# Patient Record
Sex: FEMALE | Race: WHITE | Employment: FULL TIME | URBAN - NONMETROPOLITAN AREA
[De-identification: names, ages, dates, MRNs, and addresses within clinical notes are randomized per-mention and may not be internally consistent; named-entity substitution may affect disease eponyms.]

---

## 2017-02-02 ENCOUNTER — OFFICE VISIT (OUTPATIENT)
Dept: FAMILY MEDICINE CLINIC | Age: 52
End: 2017-02-02

## 2017-02-02 VITALS
BODY MASS INDEX: 17.74 KG/M2 | SYSTOLIC BLOOD PRESSURE: 118 MMHG | HEIGHT: 59 IN | WEIGHT: 88 LBS | OXYGEN SATURATION: 98 % | HEART RATE: 94 BPM | DIASTOLIC BLOOD PRESSURE: 62 MMHG

## 2017-02-02 DIAGNOSIS — Z00.00 ROUTINE HEALTH MAINTENANCE: Primary | ICD-10-CM

## 2017-02-02 DIAGNOSIS — B97.89 VIRAL SINUSITIS: ICD-10-CM

## 2017-02-02 DIAGNOSIS — M13.80 ALLERGIC ARTHRITIS: ICD-10-CM

## 2017-02-02 DIAGNOSIS — J32.9 VIRAL SINUSITIS: ICD-10-CM

## 2017-02-02 PROCEDURE — 99396 PREV VISIT EST AGE 40-64: CPT | Performed by: NURSE PRACTITIONER

## 2017-02-02 RX ORDER — CETIRIZINE HYDROCHLORIDE 10 MG/1
10 TABLET ORAL DAILY
Qty: 30 TABLET | Refills: 11 | Status: SHIPPED | OUTPATIENT
Start: 2017-02-02 | End: 2019-03-01

## 2017-02-02 RX ORDER — METHYLPREDNISOLONE 4 MG/1
TABLET ORAL
Qty: 1 KIT | Refills: 1 | Status: SHIPPED | OUTPATIENT
Start: 2017-02-02 | End: 2017-02-08

## 2017-02-02 ASSESSMENT — ENCOUNTER SYMPTOMS
CONSTIPATION: 0
NAUSEA: 0
COUGH: 1
SINUS PRESSURE: 0
DIARRHEA: 0
GASTROINTESTINAL NEGATIVE: 1
ABDOMINAL PAIN: 0
RHINORRHEA: 1
CHEST TIGHTNESS: 0
SHORTNESS OF BREATH: 1
TROUBLE SWALLOWING: 0
ALLERGIC/IMMUNOLOGIC NEGATIVE: 1
EYES NEGATIVE: 1
VOMITING: 0

## 2017-02-14 ENCOUNTER — TELEPHONE (OUTPATIENT)
Dept: FAMILY MEDICINE CLINIC | Age: 52
End: 2017-02-14

## 2017-03-08 ENCOUNTER — TELEPHONE (OUTPATIENT)
Dept: FAMILY MEDICINE CLINIC | Age: 52
End: 2017-03-08

## 2017-03-30 ENCOUNTER — TELEPHONE (OUTPATIENT)
Dept: FAMILY MEDICINE CLINIC | Age: 52
End: 2017-03-30

## 2017-04-24 ENCOUNTER — OFFICE VISIT (OUTPATIENT)
Dept: OBGYN | Age: 52
End: 2017-04-24
Payer: COMMERCIAL

## 2017-04-24 VITALS
HEART RATE: 60 BPM | DIASTOLIC BLOOD PRESSURE: 60 MMHG | WEIGHT: 86 LBS | RESPIRATION RATE: 16 BRPM | SYSTOLIC BLOOD PRESSURE: 98 MMHG | HEIGHT: 59 IN | BODY MASS INDEX: 17.34 KG/M2

## 2017-04-24 DIAGNOSIS — R10.2 PELVIC PAIN IN FEMALE: Primary | ICD-10-CM

## 2017-04-24 DIAGNOSIS — B02.9 HERPES ZOSTER WITHOUT COMPLICATION: ICD-10-CM

## 2017-04-24 DIAGNOSIS — Z85.3 HISTORY OF BREAST CANCER: ICD-10-CM

## 2017-04-24 PROCEDURE — G8419 CALC BMI OUT NRM PARAM NOF/U: HCPCS | Performed by: OBSTETRICS & GYNECOLOGY

## 2017-04-24 PROCEDURE — 99213 OFFICE O/P EST LOW 20 MIN: CPT | Performed by: OBSTETRICS & GYNECOLOGY

## 2017-04-24 PROCEDURE — 1036F TOBACCO NON-USER: CPT | Performed by: OBSTETRICS & GYNECOLOGY

## 2017-04-24 PROCEDURE — G8427 DOCREV CUR MEDS BY ELIG CLIN: HCPCS | Performed by: OBSTETRICS & GYNECOLOGY

## 2017-04-24 PROCEDURE — 3014F SCREEN MAMMO DOC REV: CPT | Performed by: OBSTETRICS & GYNECOLOGY

## 2017-04-24 RX ORDER — VALACYCLOVIR HYDROCHLORIDE 1 G/1
TABLET, FILM COATED ORAL
Qty: 21 TABLET | Refills: 0 | Status: SHIPPED | OUTPATIENT
Start: 2017-04-24 | End: 2017-05-19 | Stop reason: ALTCHOICE

## 2017-05-19 ENCOUNTER — OFFICE VISIT (OUTPATIENT)
Dept: FAMILY MEDICINE CLINIC | Age: 52
End: 2017-05-19
Payer: COMMERCIAL

## 2017-05-19 VITALS
RESPIRATION RATE: 12 BRPM | TEMPERATURE: 98.5 F | HEIGHT: 58 IN | WEIGHT: 85.8 LBS | BODY MASS INDEX: 18.01 KG/M2 | HEART RATE: 74 BPM | SYSTOLIC BLOOD PRESSURE: 106 MMHG | DIASTOLIC BLOOD PRESSURE: 70 MMHG

## 2017-05-19 DIAGNOSIS — J01.40 ACUTE NON-RECURRENT PANSINUSITIS: Primary | ICD-10-CM

## 2017-05-19 DIAGNOSIS — Z12.11 SCREENING FOR COLON CANCER: ICD-10-CM

## 2017-05-19 PROCEDURE — G8427 DOCREV CUR MEDS BY ELIG CLIN: HCPCS | Performed by: NURSE PRACTITIONER

## 2017-05-19 PROCEDURE — 3014F SCREEN MAMMO DOC REV: CPT | Performed by: NURSE PRACTITIONER

## 2017-05-19 PROCEDURE — G8419 CALC BMI OUT NRM PARAM NOF/U: HCPCS | Performed by: NURSE PRACTITIONER

## 2017-05-19 PROCEDURE — 1036F TOBACCO NON-USER: CPT | Performed by: NURSE PRACTITIONER

## 2017-05-19 PROCEDURE — 99213 OFFICE O/P EST LOW 20 MIN: CPT | Performed by: NURSE PRACTITIONER

## 2017-05-19 RX ORDER — PREDNISONE 20 MG/1
20 TABLET ORAL 2 TIMES DAILY
Qty: 10 TABLET | Refills: 0 | Status: SHIPPED | OUTPATIENT
Start: 2017-05-19 | End: 2017-05-24

## 2017-05-19 RX ORDER — AZITHROMYCIN 250 MG/1
TABLET, FILM COATED ORAL
Qty: 1 PACKET | Refills: 0 | Status: SHIPPED | OUTPATIENT
Start: 2017-05-19 | End: 2017-05-29

## 2017-05-19 ASSESSMENT — ENCOUNTER SYMPTOMS
COUGH: 1
EYES NEGATIVE: 1
DIARRHEA: 0
SINUS PRESSURE: 1
CHEST TIGHTNESS: 0
RHINORRHEA: 1
SHORTNESS OF BREATH: 0
NAUSEA: 0
CONSTIPATION: 0
TROUBLE SWALLOWING: 0
ABDOMINAL PAIN: 0
VOMITING: 0
ALLERGIC/IMMUNOLOGIC NEGATIVE: 1
GASTROINTESTINAL NEGATIVE: 1
SORE THROAT: 1

## 2017-05-19 ASSESSMENT — PATIENT HEALTH QUESTIONNAIRE - PHQ9
SUM OF ALL RESPONSES TO PHQ QUESTIONS 1-9: 0
SUM OF ALL RESPONSES TO PHQ9 QUESTIONS 1 & 2: 0
2. FEELING DOWN, DEPRESSED OR HOPELESS: 0
1. LITTLE INTEREST OR PLEASURE IN DOING THINGS: 0

## 2017-08-02 ENCOUNTER — NURSE ONLY (OUTPATIENT)
Dept: SURGERY | Age: 52
End: 2017-08-02

## 2017-08-02 ENCOUNTER — TELEPHONE (OUTPATIENT)
Dept: SURGERY | Age: 52
End: 2017-08-02

## 2017-08-02 VITALS
HEART RATE: 76 BPM | WEIGHT: 85.2 LBS | DIASTOLIC BLOOD PRESSURE: 60 MMHG | TEMPERATURE: 98.7 F | BODY MASS INDEX: 17.18 KG/M2 | SYSTOLIC BLOOD PRESSURE: 100 MMHG | HEIGHT: 59 IN

## 2017-08-02 DIAGNOSIS — Z12.11 COLON CANCER SCREENING: Primary | ICD-10-CM

## 2017-08-02 RX ORDER — MAGNESIUM 30 MG
30 TABLET ORAL DAILY
COMMUNITY

## 2017-09-23 ENCOUNTER — ANESTHESIA EVENT (OUTPATIENT)
Dept: OPERATING ROOM | Age: 52
End: 2017-09-23
Payer: COMMERCIAL

## 2017-09-25 ENCOUNTER — ANESTHESIA (OUTPATIENT)
Dept: OPERATING ROOM | Age: 52
End: 2017-09-25
Payer: COMMERCIAL

## 2017-09-25 ENCOUNTER — HOSPITAL ENCOUNTER (OUTPATIENT)
Age: 52
Setting detail: OUTPATIENT SURGERY
Discharge: HOME | End: 2017-09-25
Attending: SURGERY | Admitting: SURGERY
Payer: COMMERCIAL

## 2017-09-25 VITALS
SYSTOLIC BLOOD PRESSURE: 171 MMHG | DIASTOLIC BLOOD PRESSURE: 85 MMHG | RESPIRATION RATE: 19 BRPM | OXYGEN SATURATION: 99 %

## 2017-09-25 VITALS
SYSTOLIC BLOOD PRESSURE: 151 MMHG | HEIGHT: 59 IN | BODY MASS INDEX: 17.14 KG/M2 | DIASTOLIC BLOOD PRESSURE: 84 MMHG | HEART RATE: 75 BPM | RESPIRATION RATE: 18 BRPM | TEMPERATURE: 98.2 F | WEIGHT: 85 LBS | OXYGEN SATURATION: 99 %

## 2017-09-25 PROCEDURE — 7100000010 HC PHASE II RECOVERY - FIRST 15 MIN: Performed by: SURGERY

## 2017-09-25 PROCEDURE — 7100000011 HC PHASE II RECOVERY - ADDTL 15 MIN: Performed by: SURGERY

## 2017-09-25 PROCEDURE — 00810 PR ANESTH,INTESTINE,SCOPE,LOW: CPT | Performed by: NURSE ANESTHETIST, CERTIFIED REGISTERED

## 2017-09-25 PROCEDURE — 3700000000 HC ANESTHESIA ATTENDED CARE: Performed by: SURGERY

## 2017-09-25 PROCEDURE — 45378 DIAGNOSTIC COLONOSCOPY: CPT | Performed by: SURGERY

## 2017-09-25 PROCEDURE — 3700000001 HC ADD 15 MINUTES (ANESTHESIA): Performed by: SURGERY

## 2017-09-25 PROCEDURE — 3609027000 HC COLONOSCOPY: Performed by: SURGERY

## 2017-09-25 PROCEDURE — 6360000002 HC RX W HCPCS

## 2017-09-25 PROCEDURE — 2580000003 HC RX 258: Performed by: SURGERY

## 2017-09-25 RX ORDER — PROPOFOL 10 MG/ML
INJECTION, EMULSION INTRAVENOUS PRN
Status: DISCONTINUED | OUTPATIENT
Start: 2017-09-25 | End: 2017-09-25 | Stop reason: SDUPTHER

## 2017-09-25 RX ORDER — SODIUM CHLORIDE, SODIUM LACTATE, POTASSIUM CHLORIDE, CALCIUM CHLORIDE 600; 310; 30; 20 MG/100ML; MG/100ML; MG/100ML; MG/100ML
INJECTION, SOLUTION INTRAVENOUS CONTINUOUS
Status: DISCONTINUED | OUTPATIENT
Start: 2017-09-25 | End: 2017-09-25 | Stop reason: HOSPADM

## 2017-09-25 RX ADMIN — SODIUM CHLORIDE, POTASSIUM CHLORIDE, SODIUM LACTATE AND CALCIUM CHLORIDE: 600; 310; 30; 20 INJECTION, SOLUTION INTRAVENOUS at 10:40

## 2017-09-25 RX ADMIN — PROPOFOL 50 MG: 10 INJECTION, EMULSION INTRAVENOUS at 10:52

## 2017-09-25 RX ADMIN — PROPOFOL 50 MG: 10 INJECTION, EMULSION INTRAVENOUS at 10:55

## 2017-09-25 RX ADMIN — PROPOFOL 50 MG: 10 INJECTION, EMULSION INTRAVENOUS at 10:59

## 2017-09-25 ASSESSMENT — PAIN SCALES - GENERAL
PAINLEVEL_OUTOF10: 1
PAINLEVEL_OUTOF10: 0
PAINLEVEL_OUTOF10: 1

## 2017-09-25 ASSESSMENT — PAIN - FUNCTIONAL ASSESSMENT: PAIN_FUNCTIONAL_ASSESSMENT: 0-10

## 2017-10-09 ENCOUNTER — TELEPHONE (OUTPATIENT)
Dept: SURGERY | Age: 52
End: 2017-10-09

## 2017-11-13 ENCOUNTER — HOSPITAL ENCOUNTER (OUTPATIENT)
Dept: MAMMOGRAPHY | Age: 52
Discharge: HOME | End: 2017-11-13
Payer: COMMERCIAL

## 2017-11-13 ENCOUNTER — OFFICE VISIT (OUTPATIENT)
Dept: OBGYN | Age: 52
End: 2017-11-13
Payer: COMMERCIAL

## 2017-11-13 ENCOUNTER — HOSPITAL ENCOUNTER (OUTPATIENT)
Age: 52
Setting detail: SPECIMEN
Discharge: HOME | End: 2017-11-13
Payer: COMMERCIAL

## 2017-11-13 VITALS
RESPIRATION RATE: 14 BRPM | SYSTOLIC BLOOD PRESSURE: 124 MMHG | DIASTOLIC BLOOD PRESSURE: 76 MMHG | HEART RATE: 76 BPM | WEIGHT: 85.8 LBS | BODY MASS INDEX: 17.3 KG/M2 | HEIGHT: 59 IN

## 2017-11-13 DIAGNOSIS — R35.0 INCREASED URINARY FREQUENCY: ICD-10-CM

## 2017-11-13 DIAGNOSIS — Z90.710 HX OF HYSTERECTOMY: ICD-10-CM

## 2017-11-13 DIAGNOSIS — Z01.419 WOMEN'S ANNUAL ROUTINE GYNECOLOGICAL EXAMINATION: Primary | ICD-10-CM

## 2017-11-13 DIAGNOSIS — Z85.3 HX: BREAST CANCER: ICD-10-CM

## 2017-11-13 DIAGNOSIS — K59.04 CHRONIC IDIOPATHIC CONSTIPATION: ICD-10-CM

## 2017-11-13 DIAGNOSIS — Z12.39 BREAST CANCER SCREENING: ICD-10-CM

## 2017-11-13 DIAGNOSIS — R10.2 PELVIC PAIN IN FEMALE: ICD-10-CM

## 2017-11-13 LAB
-: NORMAL
AMORPHOUS: NORMAL
BACTERIA: NORMAL
BILIRUBIN URINE: NEGATIVE
CASTS UA: NORMAL /LPF (ref 0–2)
COLOR: NORMAL
COMMENT UA: NORMAL
CRYSTALS, UA: NORMAL /HPF
EPITHELIAL CELLS UA: NORMAL /HPF (ref 0–5)
GLUCOSE URINE: NEGATIVE
KETONES, URINE: NEGATIVE
LEUKOCYTE ESTERASE, URINE: NEGATIVE
MUCUS: NORMAL
NITRITE, URINE: NEGATIVE
OTHER OBSERVATIONS UA: NORMAL
PH UA: 5 (ref 5–6)
PROTEIN UA: NEGATIVE
RBC UA: NORMAL /HPF (ref 0–4)
RENAL EPITHELIAL, UA: NORMAL /HPF
SPECIFIC GRAVITY UA: 1.01 (ref 1.01–1.02)
TRICHOMONAS: NORMAL
TURBIDITY: NORMAL
URINE HGB: NEGATIVE
UROBILINOGEN, URINE: NORMAL
WBC UA: NORMAL /HPF (ref 0–4)
YEAST: NORMAL

## 2017-11-13 PROCEDURE — 99396 PREV VISIT EST AGE 40-64: CPT | Performed by: ADVANCED PRACTICE MIDWIFE

## 2017-11-13 PROCEDURE — 81001 URINALYSIS AUTO W/SCOPE: CPT

## 2017-11-13 PROCEDURE — 87086 URINE CULTURE/COLONY COUNT: CPT

## 2017-11-13 PROCEDURE — G0202 SCR MAMMO BI INCL CAD: HCPCS

## 2017-11-13 ASSESSMENT — ENCOUNTER SYMPTOMS
EYES NEGATIVE: 1
RESPIRATORY NEGATIVE: 1
GASTROINTESTINAL NEGATIVE: 1

## 2017-11-13 NOTE — PROGRESS NOTES
Subjective:      Patient ID: Phuc Marroquin  is a 47 y.o. y.o. female. Warren Abraham presents today for an annual exam.  She reports most recently low abdominal pain, sharp and shooting, intermittant for about 1-2 weeks. She has c/o urinary urgency and frequency. Review of Systems   Constitutional: Negative. HENT: Negative. Eyes: Negative. Respiratory: Negative. Cardiovascular: Negative. Gastrointestinal: Negative. Genitourinary: Positive for frequency and urgency. Musculoskeletal: Negative. Skin: Negative. Neurological: Negative. Endo/Heme/Allergies: Negative. Psychiatric/Behavioral: Negative. Objective:   Physical Exam   Constitutional: She is oriented to person, place, and time. She appears well-developed and well-nourished. HENT:   Head: Normocephalic and atraumatic. Eyes: Conjunctivae and EOM are normal. Pupils are equal, round, and reactive to light. Neck: Normal range of motion. Neck supple. Cardiovascular: Normal rate, regular rhythm and normal heart sounds. Pulmonary/Chest: Effort normal and breath sounds normal.   Abdominal: Soft. Bowel sounds are normal.   Genitourinary: Vagina normal.   Genitourinary Comments: Vaginal canal pink, rugae present, nl. vaginal secretions. Cervix and uterus surgically absent. Adnexa palpable, NT, note tenderness at suprapubic area. Musculoskeletal: Normal range of motion. Neurological: She is alert and oriented to person, place, and time. She has normal reflexes. Skin: Skin is warm and dry. Psychiatric: She has a normal mood and affect. Breast exam: Refused pt. Reports seeing oncologist 11/16/17 who will perform breast exam.    Sexually active: Yes    Any bleeding or pain with intercourse: No    Last Yearly:  One year ago    Last Pap 10/26/2016 Neg. ANNABEL       Last Mammogram: 11/13/2017      Do you do self breast exams: Yes    Assessment:     1. Women's annual routine gynecological examination     2.

## 2017-11-14 LAB
CULTURE: NO GROWTH
CULTURE: NORMAL
Lab: NORMAL
SPECIMEN DESCRIPTION: NORMAL
SPECIMEN DESCRIPTION: NORMAL
STATUS: NORMAL

## 2017-11-22 ENCOUNTER — HOSPITAL ENCOUNTER (OUTPATIENT)
Dept: ULTRASOUND IMAGING | Age: 52
Discharge: HOME | End: 2017-11-22
Payer: COMMERCIAL

## 2017-11-22 DIAGNOSIS — Z90.710 HX OF HYSTERECTOMY: ICD-10-CM

## 2017-11-22 DIAGNOSIS — Z85.3 HX: BREAST CANCER: ICD-10-CM

## 2017-11-22 DIAGNOSIS — R10.2 PELVIC PAIN IN FEMALE: ICD-10-CM

## 2017-11-22 PROCEDURE — 76830 TRANSVAGINAL US NON-OB: CPT

## 2017-11-22 PROCEDURE — 76856 US EXAM PELVIC COMPLETE: CPT

## 2018-06-26 ENCOUNTER — OFFICE VISIT (OUTPATIENT)
Dept: FAMILY MEDICINE CLINIC | Age: 53
End: 2018-06-26
Payer: COMMERCIAL

## 2018-06-26 VITALS
BODY MASS INDEX: 17.09 KG/M2 | OXYGEN SATURATION: 98 % | WEIGHT: 84.8 LBS | HEIGHT: 59 IN | SYSTOLIC BLOOD PRESSURE: 102 MMHG | DIASTOLIC BLOOD PRESSURE: 80 MMHG | HEART RATE: 78 BPM

## 2018-06-26 DIAGNOSIS — L03.113 CELLULITIS OF RIGHT ARM: Primary | ICD-10-CM

## 2018-06-26 PROCEDURE — 99213 OFFICE O/P EST LOW 20 MIN: CPT | Performed by: NURSE PRACTITIONER

## 2018-06-26 RX ORDER — DOXYCYCLINE 100 MG/1
100 TABLET ORAL 2 TIMES DAILY
Qty: 14 TABLET | Refills: 0 | Status: SHIPPED | OUTPATIENT
Start: 2018-06-26 | End: 2018-07-03

## 2018-06-26 ASSESSMENT — ENCOUNTER SYMPTOMS
ALLERGIC/IMMUNOLOGIC NEGATIVE: 1
CONSTIPATION: 0
TROUBLE SWALLOWING: 0
SINUS PRESSURE: 0
NAUSEA: 0
COUGH: 0
ABDOMINAL PAIN: 0
SHORTNESS OF BREATH: 0
VOMITING: 0
CHEST TIGHTNESS: 0
DIARRHEA: 0
EYES NEGATIVE: 1

## 2018-11-16 ENCOUNTER — HOSPITAL ENCOUNTER (OUTPATIENT)
Dept: MAMMOGRAPHY | Age: 53
Discharge: HOME | End: 2018-11-18
Payer: COMMERCIAL

## 2018-11-16 ENCOUNTER — TELEPHONE (OUTPATIENT)
Dept: GENERAL RADIOLOGY | Age: 53
End: 2018-11-16

## 2018-11-16 DIAGNOSIS — Z17.0 MALIGNANT NEOPLASM OF LOWER-OUTER QUADRANT OF LEFT BREAST OF FEMALE, ESTROGEN RECEPTOR POSITIVE (HCC): ICD-10-CM

## 2018-11-16 DIAGNOSIS — C50.512 MALIGNANT NEOPLASM OF LOWER-OUTER QUADRANT OF LEFT BREAST OF FEMALE, ESTROGEN RECEPTOR POSITIVE (HCC): ICD-10-CM

## 2018-11-16 PROCEDURE — 77067 SCR MAMMO BI INCL CAD: CPT

## 2019-02-18 LAB
CHOLESTEROL, TOTAL: 194 MG/DL
CHOLESTEROL/HDL RATIO: 3.8
CREATININE: 0.8 MG/DL
HDLC SERPL-MCNC: 51 MG/DL (ref 35–70)
LDL CHOLESTEROL CALCULATED: 130 MG/DL (ref 0–160)
POTASSIUM (K+): 4.2
TRIGL SERPL-MCNC: 64 MG/DL
TSH SERPL DL<=0.05 MIU/L-ACNC: 4.17 UIU/ML
VLDLC SERPL CALC-MCNC: 13 MG/DL

## 2019-03-01 ENCOUNTER — OFFICE VISIT (OUTPATIENT)
Dept: FAMILY MEDICINE CLINIC | Age: 54
End: 2019-03-01
Payer: COMMERCIAL

## 2019-03-01 VITALS
TEMPERATURE: 99.3 F | HEART RATE: 92 BPM | HEIGHT: 58 IN | BODY MASS INDEX: 19.1 KG/M2 | SYSTOLIC BLOOD PRESSURE: 104 MMHG | DIASTOLIC BLOOD PRESSURE: 62 MMHG | OXYGEN SATURATION: 98 % | WEIGHT: 91 LBS | RESPIRATION RATE: 12 BRPM

## 2019-03-01 DIAGNOSIS — Z01.419 ENCOUNTER FOR WELL WOMAN EXAM: ICD-10-CM

## 2019-03-01 DIAGNOSIS — J32.9 VIRAL SINUSITIS: Primary | ICD-10-CM

## 2019-03-01 DIAGNOSIS — B97.89 VIRAL SINUSITIS: Primary | ICD-10-CM

## 2019-03-01 PROCEDURE — 99213 OFFICE O/P EST LOW 20 MIN: CPT | Performed by: NURSE PRACTITIONER

## 2019-03-01 ASSESSMENT — ENCOUNTER SYMPTOMS
GASTROINTESTINAL NEGATIVE: 1
RESPIRATORY NEGATIVE: 1

## 2019-03-01 ASSESSMENT — PATIENT HEALTH QUESTIONNAIRE - PHQ9
SUM OF ALL RESPONSES TO PHQ9 QUESTIONS 1 & 2: 0
SUM OF ALL RESPONSES TO PHQ QUESTIONS 1-9: 0
SUM OF ALL RESPONSES TO PHQ QUESTIONS 1-9: 0
1. LITTLE INTEREST OR PLEASURE IN DOING THINGS: 0
2. FEELING DOWN, DEPRESSED OR HOPELESS: 0

## 2019-03-18 ENCOUNTER — HOSPITAL ENCOUNTER (OUTPATIENT)
Age: 54
Setting detail: SPECIMEN
Discharge: HOME | End: 2019-03-18
Payer: COMMERCIAL

## 2019-03-18 ENCOUNTER — OFFICE VISIT (OUTPATIENT)
Dept: OBGYN | Age: 54
End: 2019-03-18
Payer: COMMERCIAL

## 2019-03-18 VITALS
HEIGHT: 58 IN | BODY MASS INDEX: 18.68 KG/M2 | SYSTOLIC BLOOD PRESSURE: 124 MMHG | RESPIRATION RATE: 18 BRPM | WEIGHT: 89 LBS | HEART RATE: 78 BPM | DIASTOLIC BLOOD PRESSURE: 78 MMHG

## 2019-03-18 DIAGNOSIS — Z12.4 PAP SMEAR FOR CERVICAL CANCER SCREENING: ICD-10-CM

## 2019-03-18 DIAGNOSIS — R10.2 PELVIC PAIN IN FEMALE: Primary | ICD-10-CM

## 2019-03-18 DIAGNOSIS — Z90.710 HX OF HYSTERECTOMY: ICD-10-CM

## 2019-03-18 DIAGNOSIS — Z85.3 HX: BREAST CANCER: ICD-10-CM

## 2019-03-18 DIAGNOSIS — N89.8 VAGINAL DISCHARGE: ICD-10-CM

## 2019-03-18 PROCEDURE — 99213 OFFICE O/P EST LOW 20 MIN: CPT | Performed by: OBSTETRICS & GYNECOLOGY

## 2019-03-18 PROCEDURE — G0145 SCR C/V CYTO,THINLAYER,RESCR: HCPCS

## 2019-03-18 RX ORDER — CLINDAMYCIN PHOSPHATE 20 MG/G
CREAM VAGINAL
Qty: 1 TUBE | Refills: 0 | Status: SHIPPED | OUTPATIENT
Start: 2019-03-18 | End: 2019-12-01 | Stop reason: ALTCHOICE

## 2019-03-18 ASSESSMENT — ENCOUNTER SYMPTOMS: CONSTIPATION: 1

## 2019-03-19 LAB — COMMENT: NORMAL

## 2019-03-27 LAB — CYTOLOGY REPORT: NORMAL

## 2019-10-01 ENCOUNTER — TELEPHONE (OUTPATIENT)
Dept: OBGYN | Age: 54
End: 2019-10-01

## 2019-11-19 ENCOUNTER — HOSPITAL ENCOUNTER (OUTPATIENT)
Dept: MAMMOGRAPHY | Age: 54
Discharge: HOME | End: 2019-11-21
Payer: COMMERCIAL

## 2019-11-19 DIAGNOSIS — Z85.3 PERSONAL HISTORY OF MALIGNANT NEOPLASM OF BREAST: ICD-10-CM

## 2019-11-19 PROCEDURE — 77063 BREAST TOMOSYNTHESIS BI: CPT

## 2019-12-01 ENCOUNTER — OFFICE VISIT (OUTPATIENT)
Dept: PRIMARY CARE CLINIC | Age: 54
End: 2019-12-01
Payer: COMMERCIAL

## 2019-12-01 VITALS
BODY MASS INDEX: 17.38 KG/M2 | HEART RATE: 76 BPM | DIASTOLIC BLOOD PRESSURE: 70 MMHG | RESPIRATION RATE: 14 BRPM | WEIGHT: 86.2 LBS | TEMPERATURE: 98.3 F | OXYGEN SATURATION: 98 % | SYSTOLIC BLOOD PRESSURE: 114 MMHG | HEIGHT: 59 IN

## 2019-12-01 DIAGNOSIS — H66.002 ACUTE SUPPURATIVE OTITIS MEDIA OF LEFT EAR WITHOUT SPONTANEOUS RUPTURE OF TYMPANIC MEMBRANE, RECURRENCE NOT SPECIFIED: ICD-10-CM

## 2019-12-01 DIAGNOSIS — H66.002 NON-RECURRENT ACUTE SUPPURATIVE OTITIS MEDIA OF LEFT EAR WITHOUT SPONTANEOUS RUPTURE OF TYMPANIC MEMBRANE: Primary | ICD-10-CM

## 2019-12-01 PROCEDURE — 99213 OFFICE O/P EST LOW 20 MIN: CPT | Performed by: PHYSICIAN ASSISTANT

## 2019-12-01 RX ORDER — DOXYCYCLINE HYCLATE 100 MG
100 TABLET ORAL 2 TIMES DAILY
Qty: 20 TABLET | Refills: 0 | Status: SHIPPED | OUTPATIENT
Start: 2019-12-01 | End: 2019-12-11

## 2019-12-01 ASSESSMENT — ENCOUNTER SYMPTOMS
SORE THROAT: 0
COUGH: 1
SINUS COMPLAINT: 1
SINUS PRESSURE: 1

## 2020-11-19 ENCOUNTER — HOSPITAL ENCOUNTER (OUTPATIENT)
Dept: MAMMOGRAPHY | Age: 55
Discharge: HOME | End: 2020-11-21
Payer: COMMERCIAL

## 2020-11-19 ENCOUNTER — HOSPITAL ENCOUNTER (OUTPATIENT)
Dept: LAB | Age: 55
Discharge: HOME | End: 2020-11-19
Payer: COMMERCIAL

## 2020-11-19 LAB
ABSOLUTE EOS #: 0.22 K/UL (ref 0–0.44)
ABSOLUTE IMMATURE GRANULOCYTE: <0.03 K/UL (ref 0–0.3)
ABSOLUTE LYMPH #: 2 K/UL (ref 1.1–3.7)
ABSOLUTE MONO #: 0.56 K/UL (ref 0.1–1.2)
ALBUMIN SERPL-MCNC: 4.5 G/DL (ref 3.5–5.2)
ALBUMIN/GLOBULIN RATIO: 1.6 (ref 1–2.5)
ALP BLD-CCNC: 149 U/L (ref 35–104)
ALT SERPL-CCNC: 19 U/L (ref 5–33)
AST SERPL-CCNC: 28 U/L
BASOPHILS # BLD: 1 % (ref 0–2)
BASOPHILS ABSOLUTE: 0.04 K/UL (ref 0–0.2)
BILIRUB SERPL-MCNC: 0.23 MG/DL (ref 0.3–1.2)
BILIRUBIN DIRECT: <0.08 MG/DL
BILIRUBIN, INDIRECT: ABNORMAL MG/DL (ref 0–1)
CALCIUM SERPL-MCNC: 10 MG/DL (ref 8.6–10.4)
DIFFERENTIAL TYPE: NORMAL
EOSINOPHILS RELATIVE PERCENT: 3 % (ref 1–4)
GLOBULIN: 2.8 G/DL (ref 1.5–3.8)
HCT VFR BLD CALC: 46.3 % (ref 36.3–47.1)
HEMOGLOBIN: 14.8 G/DL (ref 11.9–15.1)
IMMATURE GRANULOCYTES: 0 %
LYMPHOCYTES # BLD: 25 % (ref 24–43)
MCH RBC QN AUTO: 29.1 PG (ref 25.2–33.5)
MCHC RBC AUTO-ENTMCNC: 32 G/DL (ref 25.2–33.5)
MCV RBC AUTO: 91 FL (ref 82.6–102.9)
MONOCYTES # BLD: 7 % (ref 3–12)
NRBC AUTOMATED: 0 PER 100 WBC
PDW BLD-RTO: 13.3 % (ref 11.8–14.4)
PLATELET # BLD: 294 K/UL (ref 138–453)
PLATELET ESTIMATE: NORMAL
PMV BLD AUTO: 9.2 FL (ref 8.1–13.5)
RBC # BLD: 5.09 M/UL (ref 3.95–5.11)
RBC # BLD: NORMAL 10*6/UL
SEG NEUTROPHILS: 64 % (ref 36–65)
SEGMENTED NEUTROPHILS ABSOLUTE COUNT: 5.31 K/UL (ref 1.5–8.1)
TOTAL PROTEIN: 7.3 G/DL (ref 6.4–8.3)
WBC # BLD: 8.2 K/UL (ref 3.5–11.3)
WBC # BLD: NORMAL 10*3/UL

## 2020-11-19 PROCEDURE — 82310 ASSAY OF CALCIUM: CPT

## 2020-11-19 PROCEDURE — 36415 COLL VENOUS BLD VENIPUNCTURE: CPT

## 2020-11-19 PROCEDURE — 85025 COMPLETE CBC W/AUTO DIFF WBC: CPT

## 2020-11-19 PROCEDURE — 80076 HEPATIC FUNCTION PANEL: CPT

## 2020-11-19 PROCEDURE — 77063 BREAST TOMOSYNTHESIS BI: CPT

## 2021-06-03 NOTE — LETTER
[facility] [address]  [address]  Phone: [phone]  Fax: [phone]    Warren Martínez MD      10/9/2017    Dear Murali Ramirez,      Dr. Juliana Dillard reviewed the results of your recent colonoscopy. Your colon was normal.      His recommendations are to be scoped again in 10 years, due to colon cancer screening. If you have any questions or concerns regarding your test results or our recommendations, please call the office at [phone].       Sincerely,           [facility]
95.3

## 2021-08-05 ENCOUNTER — TELEPHONE (OUTPATIENT)
Dept: FAMILY MEDICINE CLINIC | Age: 56
End: 2021-08-05

## 2021-08-05 NOTE — TELEPHONE ENCOUNTER
----- Message from Lynne Osler sent at 8/5/2021 10:05 AM EDT -----  Subject: Appointment Request    Reason for Call: Semi-Routine Skin Problem    QUESTIONS  Type of Appointment? Established Patient  Reason for appointment request? No appointments available during search  Additional Information for Provider? attempted to schedule the patient and   she is concerned about the patient   ---------------------------------------------------------------------------  --------------  CALL BACK INFO  What is the best way for the office to contact you? OK to leave message on   voicemail  Preferred Call Back Phone Number? [phone]  ---------------------------------------------------------------------------  --------------  SCRIPT ANSWERS  Relationship to Patient? Self  Specialty Confirmation? Primary Care  Are you having swelling in your throat or face? No  Are you having difficulty breathing? No  Have the symptoms worsened or spread in the last day? No  Are you having fevers (100.4), chills or sweats? No  Have you recently (14 days) seen a provider for this issue? No  Have you been diagnosed with, awaiting test results for, or told that you   are suspected of having COVID-19 (Coronavirus)? (If patient has tested   negative or was tested as a requirement for work, school, or travel and   not based on symptoms, answer no)? No  Do you currently have flu-like symptoms including fever or chills, cough,   shortness of breath, difficulty breathing, or new loss of taste or smell? No  Have you had close contact with someone with COVID-19 in the last 14 days? No  (Service Expert  click yes below to proceed with [employer]) As Usual   Scheduling)?  Yes

## 2021-08-05 NOTE — TELEPHONE ENCOUNTER
Patient requesting a new to establish visit.   When offered a NTP appointment in October with Dr. Luiza Forbes she stated \"nevermind I can just see my Oncologist.\"  She did not wish to schedule at this time

## 2021-11-23 ENCOUNTER — HOSPITAL ENCOUNTER (OUTPATIENT)
Dept: MAMMOGRAPHY | Age: 56
Discharge: HOME | End: 2021-11-25
Payer: COMMERCIAL

## 2021-11-23 ENCOUNTER — HOSPITAL ENCOUNTER (OUTPATIENT)
Dept: LAB | Age: 56
Discharge: HOME | End: 2021-11-23
Payer: COMMERCIAL

## 2021-11-23 DIAGNOSIS — Z12.31 ENCOUNTER FOR SCREENING MAMMOGRAM FOR BREAST CANCER: ICD-10-CM

## 2021-11-23 DIAGNOSIS — Z85.3 PERSONAL HISTORY OF MALIGNANT NEOPLASM OF BREAST: ICD-10-CM

## 2021-11-23 LAB
ABSOLUTE EOS #: 0.26 K/UL (ref 0–0.44)
ABSOLUTE IMMATURE GRANULOCYTE: 0.03 K/UL (ref 0–0.3)
ABSOLUTE LYMPH #: 1.77 K/UL (ref 1.1–3.7)
ABSOLUTE MONO #: 0.55 K/UL (ref 0.1–1.2)
ALBUMIN SERPL-MCNC: 4.2 G/DL (ref 3.5–5.2)
ALBUMIN/GLOBULIN RATIO: 1.5 (ref 1–2.5)
ALP BLD-CCNC: 150 U/L (ref 35–104)
ALT SERPL-CCNC: 13 U/L (ref 5–33)
ANION GAP SERPL CALCULATED.3IONS-SCNC: 9 MMOL/L (ref 9–17)
AST SERPL-CCNC: 23 U/L
BASOPHILS # BLD: 0 % (ref 0–2)
BASOPHILS ABSOLUTE: 0.03 K/UL (ref 0–0.2)
BILIRUB SERPL-MCNC: 0.2 MG/DL (ref 0.3–1.2)
BUN BLDV-MCNC: 15 MG/DL (ref 6–20)
BUN/CREAT BLD: 17 (ref 9–20)
CALCIUM SERPL-MCNC: 9.5 MG/DL (ref 8.6–10.4)
CHLORIDE BLD-SCNC: 105 MMOL/L (ref 98–107)
CO2: 29 MMOL/L (ref 20–31)
CREAT SERPL-MCNC: 0.87 MG/DL (ref 0.5–0.9)
DIFFERENTIAL TYPE: NORMAL
EOSINOPHILS RELATIVE PERCENT: 4 % (ref 1–4)
GFR AFRICAN AMERICAN: >60 ML/MIN
GFR NON-AFRICAN AMERICAN: >60 ML/MIN
GFR SERPL CREATININE-BSD FRML MDRD: ABNORMAL ML/MIN/{1.73_M2}
GFR SERPL CREATININE-BSD FRML MDRD: ABNORMAL ML/MIN/{1.73_M2}
GLUCOSE BLD-MCNC: 110 MG/DL (ref 70–99)
HCT VFR BLD CALC: 42.1 % (ref 36.3–47.1)
HEMOGLOBIN: 13.7 G/DL (ref 11.9–15.1)
IMMATURE GRANULOCYTES: 0 %
LYMPHOCYTES # BLD: 26 % (ref 24–43)
MCH RBC QN AUTO: 29 PG (ref 25.2–33.5)
MCHC RBC AUTO-ENTMCNC: 32.5 G/DL (ref 25.2–33.5)
MCV RBC AUTO: 89 FL (ref 82.6–102.9)
MONOCYTES # BLD: 8 % (ref 3–12)
NRBC AUTOMATED: 0 PER 100 WBC
PDW BLD-RTO: 13.3 % (ref 11.8–14.4)
PLATELET # BLD: 258 K/UL (ref 138–453)
PLATELET ESTIMATE: NORMAL
PMV BLD AUTO: 9.1 FL (ref 8.1–13.5)
POTASSIUM SERPL-SCNC: 3.9 MMOL/L (ref 3.7–5.3)
RBC # BLD: 4.73 M/UL (ref 3.95–5.11)
RBC # BLD: NORMAL 10*6/UL
SEG NEUTROPHILS: 62 % (ref 36–65)
SEGMENTED NEUTROPHILS ABSOLUTE COUNT: 4.27 K/UL (ref 1.5–8.1)
SODIUM BLD-SCNC: 143 MMOL/L (ref 135–144)
TOTAL PROTEIN: 7 G/DL (ref 6.4–8.3)
WBC # BLD: 6.9 K/UL (ref 3.5–11.3)
WBC # BLD: NORMAL 10*3/UL

## 2021-11-23 PROCEDURE — 80053 COMPREHEN METABOLIC PANEL: CPT

## 2021-11-23 PROCEDURE — 36415 COLL VENOUS BLD VENIPUNCTURE: CPT

## 2021-11-23 PROCEDURE — 77063 BREAST TOMOSYNTHESIS BI: CPT

## 2021-11-23 PROCEDURE — 85025 COMPLETE CBC W/AUTO DIFF WBC: CPT

## 2022-06-03 ENCOUNTER — HOSPITAL ENCOUNTER (OUTPATIENT)
Dept: LAB | Age: 57
Discharge: HOME | End: 2022-06-03
Payer: COMMERCIAL

## 2022-06-03 ENCOUNTER — HOSPITAL ENCOUNTER (OUTPATIENT)
Dept: MRI IMAGING | Age: 57
Discharge: HOME | End: 2022-06-05
Payer: COMMERCIAL

## 2022-06-03 DIAGNOSIS — Z85.3 HISTORY OF LEFT BREAST CANCER: ICD-10-CM

## 2022-06-03 LAB
ABSOLUTE EOS #: 0.15 K/UL (ref 0–0.44)
ABSOLUTE IMMATURE GRANULOCYTE: <0.03 K/UL (ref 0–0.3)
ABSOLUTE LYMPH #: 1.75 K/UL (ref 1.1–3.7)
ABSOLUTE MONO #: 0.54 K/UL (ref 0.1–1.2)
ALBUMIN SERPL-MCNC: 4.2 G/DL (ref 3.5–5.2)
ALBUMIN/GLOBULIN RATIO: 1.7 (ref 1–2.5)
ALP BLD-CCNC: 134 U/L (ref 35–104)
ALT SERPL-CCNC: 14 U/L (ref 5–33)
ANION GAP SERPL CALCULATED.3IONS-SCNC: 9 MMOL/L (ref 9–17)
AST SERPL-CCNC: 26 U/L
BASOPHILS # BLD: 0 % (ref 0–2)
BASOPHILS ABSOLUTE: 0.03 K/UL (ref 0–0.2)
BILIRUB SERPL-MCNC: 0.26 MG/DL (ref 0.3–1.2)
BUN BLDV-MCNC: 14 MG/DL (ref 6–20)
BUN/CREAT BLD: 16 (ref 9–20)
CALCIUM SERPL-MCNC: 8.4 MG/DL (ref 8.6–10.4)
CHLORIDE BLD-SCNC: 104 MMOL/L (ref 98–107)
CO2: 27 MMOL/L (ref 20–31)
CREAT SERPL-MCNC: 0.9 MG/DL (ref 0.5–0.9)
EOSINOPHILS RELATIVE PERCENT: 2 % (ref 1–4)
GFR AFRICAN AMERICAN: >60 ML/MIN
GFR NON-AFRICAN AMERICAN: >60 ML/MIN
GFR SERPL CREATININE-BSD FRML MDRD: ABNORMAL ML/MIN/{1.73_M2}
GLUCOSE BLD-MCNC: 100 MG/DL (ref 70–99)
HCT VFR BLD CALC: 41.8 % (ref 36.3–47.1)
HEMOGLOBIN: 13.7 G/DL (ref 11.9–15.1)
IMMATURE GRANULOCYTES: 0 %
LYMPHOCYTES # BLD: 26 % (ref 24–43)
MCH RBC QN AUTO: 29.3 PG (ref 25.2–33.5)
MCHC RBC AUTO-ENTMCNC: 32.8 G/DL (ref 25.2–33.5)
MCV RBC AUTO: 89.3 FL (ref 82.6–102.9)
MONOCYTES # BLD: 8 % (ref 3–12)
NRBC AUTOMATED: 0 PER 100 WBC
PDW BLD-RTO: 13.5 % (ref 11.8–14.4)
PLATELET # BLD: 268 K/UL (ref 138–453)
PMV BLD AUTO: 9.3 FL (ref 8.1–13.5)
POTASSIUM SERPL-SCNC: 4.2 MMOL/L (ref 3.7–5.3)
RBC # BLD: 4.68 M/UL (ref 3.95–5.11)
SEG NEUTROPHILS: 64 % (ref 36–65)
SEGMENTED NEUTROPHILS ABSOLUTE COUNT: 4.34 K/UL (ref 1.5–8.1)
SODIUM BLD-SCNC: 140 MMOL/L (ref 135–144)
TOTAL PROTEIN: 6.7 G/DL (ref 6.4–8.3)
WBC # BLD: 6.8 K/UL (ref 3.5–11.3)

## 2022-06-03 PROCEDURE — A9579 GAD-BASE MR CONTRAST NOS,1ML: HCPCS | Performed by: INTERNAL MEDICINE

## 2022-06-03 PROCEDURE — 6360000004 HC RX CONTRAST MEDICATION: Performed by: INTERNAL MEDICINE

## 2022-06-03 PROCEDURE — 85025 COMPLETE CBC W/AUTO DIFF WBC: CPT

## 2022-06-03 PROCEDURE — 2709999900 MRI BREAST BILATERAL W WO CONTRAST

## 2022-06-03 PROCEDURE — 80053 COMPREHEN METABOLIC PANEL: CPT

## 2022-06-03 PROCEDURE — 36415 COLL VENOUS BLD VENIPUNCTURE: CPT

## 2022-06-03 RX ADMIN — GADOTERIDOL 20 ML: 279.3 INJECTION, SOLUTION INTRAVENOUS at 14:36

## 2022-08-24 ENCOUNTER — OFFICE VISIT (OUTPATIENT)
Dept: PRIMARY CARE CLINIC | Age: 57
End: 2022-08-24
Payer: COMMERCIAL

## 2022-08-24 ENCOUNTER — HOSPITAL ENCOUNTER (OUTPATIENT)
Age: 57
Setting detail: SPECIMEN
Discharge: HOME | End: 2022-08-24
Payer: COMMERCIAL

## 2022-08-24 VITALS
DIASTOLIC BLOOD PRESSURE: 84 MMHG | RESPIRATION RATE: 20 BRPM | HEART RATE: 70 BPM | OXYGEN SATURATION: 97 % | TEMPERATURE: 96.4 F | SYSTOLIC BLOOD PRESSURE: 120 MMHG | HEIGHT: 59 IN | WEIGHT: 87.25 LBS | BODY MASS INDEX: 17.59 KG/M2

## 2022-08-24 DIAGNOSIS — R30.9 URINARY PAIN: ICD-10-CM

## 2022-08-24 DIAGNOSIS — N30.00 ACUTE CYSTITIS WITHOUT HEMATURIA: Primary | ICD-10-CM

## 2022-08-24 LAB
BACTERIA: ABNORMAL
BILIRUBIN URINE: ABNORMAL
COMMENT UA: ABNORMAL
EPITHELIAL CELLS UA: ABNORMAL /HPF (ref 0–5)
GLUCOSE URINE: ABNORMAL
KETONES, URINE: ABNORMAL
LEUKOCYTE ESTERASE, URINE: ABNORMAL
NITRITE, URINE: POSITIVE
OTHER OBSERVATIONS UA: ABNORMAL
PH UA: 5 (ref 5–6)
PROTEIN UA: ABNORMAL
RBC UA: ABNORMAL /HPF (ref 0–4)
SPECIFIC GRAVITY UA: 1.01 (ref 1.01–1.02)
URINE HGB: ABNORMAL
UROBILINOGEN, URINE: ABNORMAL
WBC UA: ABNORMAL /HPF (ref 0–4)

## 2022-08-24 PROCEDURE — 87086 URINE CULTURE/COLONY COUNT: CPT

## 2022-08-24 PROCEDURE — 81001 URINALYSIS AUTO W/SCOPE: CPT

## 2022-08-24 PROCEDURE — 99213 OFFICE O/P EST LOW 20 MIN: CPT | Performed by: NURSE PRACTITIONER

## 2022-08-24 RX ORDER — NITROFURANTOIN 25; 75 MG/1; MG/1
100 CAPSULE ORAL 2 TIMES DAILY
Qty: 20 CAPSULE | Refills: 0 | Status: SHIPPED | OUTPATIENT
Start: 2022-08-24 | End: 2022-09-03

## 2022-08-24 RX ORDER — FLUCONAZOLE 150 MG/1
TABLET ORAL
Qty: 2 TABLET | Refills: 0 | Status: SHIPPED | OUTPATIENT
Start: 2022-08-24

## 2022-08-24 SDOH — ECONOMIC STABILITY: FOOD INSECURITY: WITHIN THE PAST 12 MONTHS, YOU WORRIED THAT YOUR FOOD WOULD RUN OUT BEFORE YOU GOT MONEY TO BUY MORE.: NEVER TRUE

## 2022-08-24 SDOH — ECONOMIC STABILITY: FOOD INSECURITY: WITHIN THE PAST 12 MONTHS, THE FOOD YOU BOUGHT JUST DIDN'T LAST AND YOU DIDN'T HAVE MONEY TO GET MORE.: NEVER TRUE

## 2022-08-24 ASSESSMENT — PATIENT HEALTH QUESTIONNAIRE - PHQ9
SUM OF ALL RESPONSES TO PHQ QUESTIONS 1-9: 0
SUM OF ALL RESPONSES TO PHQ9 QUESTIONS 1 & 2: 0
SUM OF ALL RESPONSES TO PHQ QUESTIONS 1-9: 0
1. LITTLE INTEREST OR PLEASURE IN DOING THINGS: 0
SUM OF ALL RESPONSES TO PHQ QUESTIONS 1-9: 0
2. FEELING DOWN, DEPRESSED OR HOPELESS: 0
SUM OF ALL RESPONSES TO PHQ QUESTIONS 1-9: 0

## 2022-08-24 ASSESSMENT — ENCOUNTER SYMPTOMS
NAUSEA: 0
VOMITING: 0
COUGH: 0
WHEEZING: 0
SHORTNESS OF BREATH: 0

## 2022-08-24 ASSESSMENT — SOCIAL DETERMINANTS OF HEALTH (SDOH): HOW HARD IS IT FOR YOU TO PAY FOR THE VERY BASICS LIKE FOOD, HOUSING, MEDICAL CARE, AND HEATING?: NOT HARD AT ALL

## 2022-08-24 NOTE — PROGRESS NOTES
[address] [facility]  [address] [place] [address], [id number]  [phone]      HPI:     Urinary Frequency   This is a new problem. The current episode started 1 to 4 weeks ago. The problem occurs every urination. The problem has been unchanged. The quality of the pain is described as burning. The patient is experiencing no pain. There has been no fever. She is Sexually active. There is No history of pyelonephritis. Associated symptoms include frequency, hesitancy and urgency. Pertinent negatives include no nausea or vomiting. Treatments tried: urocalm. The treatment provided mild relief. Current Outpatient Medications   Medication Sig Dispense Refill    nitrofurantoin, macrocrystal-monohydrate, (MACROBID) 100 MG capsule Take 1 capsule by mouth 2 times daily for 10 days 20 capsule 0    fluconazole (DIFLUCAN) 150 MG tablet Take one daily now and repeat dose in 48 hrs. 2 tablet 0    Collagen 500 MG CAPS Take by mouth      Multiple Vitamin (MULTIVITAMINS PO) Take 1 capsule by mouth 2 times daily      magnesium 30 MG tablet Take 30 mg by mouth daily       No current facility-administered medications for this visit. Allergies   Allergen Reactions    Other      Does not tolerate pain medications causes itching, rash, vomiting    Codeine Rash    Darvon [Propoxyphene] Nausea And Vomiting    Penicillins Itching and Rash    Sulfa Antibiotics Itching and Rash       All patients pastmedical, surgical, social and family history has been reviewed. Subjective:      Review of Systems   Constitutional:  Negative for activity change, appetite change, fatigue and fever. Respiratory:  Negative for cough, shortness of breath and wheezing. Cardiovascular:  Negative for chest pain and palpitations. Gastrointestinal:  Negative for nausea and vomiting. Genitourinary:  Positive for dysuria, frequency, hesitancy and urgency. Objective:      Physical Exam  Vitals and nursing note reviewed. 82.6 - 102.9 fL Final    MCH 06/03/2022 29.3  25.2 - 33.5 pg Final    MCHC 06/03/2022 32.8  25.2 - 33.5 g/dL Final    RDW 06/03/2022 13.5  11.8 - 14.4 % Final    Platelets 59/66/9539 268  138 - 453 k/uL Final    MPV 06/03/2022 9.3  8.1 - 13.5 fL Final    NRBC Automated 06/03/2022 0.0  0.0 per 100 WBC Final    Seg Neutrophils 06/03/2022 64  36 - 65 % Final    Lymphocytes 06/03/2022 26  24 - 43 % Final    Monocytes 06/03/2022 8  3 - 12 % Final    Eosinophils % 06/03/2022 2  1 - 4 % Final    Basophils 06/03/2022 0  0 - 2 % Final    Immature Granulocytes 06/03/2022 0  0 % Final    Segs Absolute 06/03/2022 4.34  1.50 - 8.10 k/uL Final    Absolute Lymph # 06/03/2022 1.75  1.10 - 3.70 k/uL Final    Absolute Mono # 06/03/2022 0.54  0.10 - 1.20 k/uL Final    Absolute Eos # 06/03/2022 0.15  0.00 - 0.44 k/uL Final    Basophils Absolute 06/03/2022 0.03  0.00 - 0.20 k/uL Final    Absolute Immature Granulocyte 06/03/2022 <0.03  0.00 - 0.30 k/uL Final    Glucose 06/03/2022 100 (A) 70 - 99 mg/dL Final    BUN 06/03/2022 14  6 - 20 mg/dL Final    Creatinine 06/03/2022 0.90  0.50 - 0.90 mg/dL Final    Bun/Cre Ratio 06/03/2022 16  9 - 20 Final    Calcium 06/03/2022 8.4 (A) 8.6 - 10.4 mg/dL Final    Sodium 06/03/2022 140  135 - 144 mmol/L Final    Potassium 06/03/2022 4.2  3.7 - 5.3 mmol/L Final    Chloride 06/03/2022 104  98 - 107 mmol/L Final    CO2 06/03/2022 27  20 - 31 mmol/L Final    Anion Gap 06/03/2022 9  9 - 17 mmol/L Final    Alkaline Phosphatase 06/03/2022 134 (A) 35 - 104 U/L Final    ALT 06/03/2022 14  5 - 33 U/L Final    AST 06/03/2022 26  <32 U/L Final    Total Bilirubin 06/03/2022 0.26 (A) 0.3 - 1.2 mg/dL Final    Total Protein 06/03/2022 6.7  6.4 - 8.3 g/dL Final    Albumin 06/03/2022 4.2  3.5 - 5.2 g/dL Final    Albumin/Globulin Ratio 06/03/2022 1.7  1.0 - 2.5 Final    GFR Non- 06/03/2022 >60  >60 mL/min Final    GFR  06/03/2022 >60  >60 mL/min Final    GFR Comment 06/03/2022 Final    Comment: Average GFR for 52-63 years old:   80 mL/min/1.73sq m  Chronic Kidney Disease:   <60 mL/min/1.73sq m  Kidney failure:   <15 mL/min/1.73sq m              eGFR calculated using average adult body mass. Additional eGFR calculator available at:        [email or web address]               Assessment:       Diagnosis Orders   1. Acute cystitis without hematuria        2. Urinary pain  Urinalysis with Reflex to Culture          Plan:      Macrobid 100mg 1 tablet BID for 10 days  Push fluids  Diflucan 150mg as directed if needed  Will call with culture results  Pt to return if symptoms do not improve or worsen   Return PRN   No follow-ups on file. Orders Placed This Encounter   Procedures    Urinalysis with Reflex to Culture     Standing Status:   Future     Number of Occurrences:   1     Standing Expiration Date:   8/24/2023     Order Specific Question:   SPECIFY(EX-CATH,MIDSTREAM,CYSTO,ETC)? Answer:   midstream     Orders Placed This Encounter   Medications    nitrofurantoin, macrocrystal-monohydrate, (MACROBID) 100 MG capsule     Sig: Take 1 capsule by mouth 2 times daily for 10 days     Dispense:  20 capsule     Refill:  0    fluconazole (DIFLUCAN) 150 MG tablet     Sig: Take one daily now and repeat dose in 48 hrs. Dispense:  2 tablet     Refill:  0       Patient given educational materials - see patient instructions. All patient questionsanswered. Pt voiced understanding. Reviewed health maintenance.      Electronically signed by LUIS Gibson CNP, CNP on 8/24/2022 at 8:58 AM

## 2022-08-25 LAB
CULTURE: NORMAL
SPECIMEN DESCRIPTION: NORMAL

## 2023-02-16 ENCOUNTER — HOSPITAL ENCOUNTER (OUTPATIENT)
Age: 58
Discharge: HOME | End: 2023-02-16
Payer: COMMERCIAL

## 2023-02-16 LAB
ABSOLUTE EOS #: 0.23 K/UL (ref 0–0.44)
ABSOLUTE IMMATURE GRANULOCYTE: <0.03 K/UL (ref 0–0.3)
ABSOLUTE LYMPH #: 1.55 K/UL (ref 1.1–3.7)
ABSOLUTE MONO #: 0.52 K/UL (ref 0.1–1.2)
BASOPHILS # BLD: 1 % (ref 0–2)
BASOPHILS ABSOLUTE: 0.04 K/UL (ref 0–0.2)
BUN SERPL-MCNC: 17 MG/DL (ref 6–20)
CREAT SERPL-MCNC: 0.68 MG/DL (ref 0.5–0.9)
EOSINOPHILS RELATIVE PERCENT: 4 % (ref 1–4)
GFR SERPL CREATININE-BSD FRML MDRD: >60 ML/MIN/1.73M2
HCT VFR BLD AUTO: 43.9 % (ref 36.3–47.1)
HGB BLD-MCNC: 14.1 G/DL (ref 11.9–15.1)
IMMATURE GRANULOCYTES: 0 %
LYMPHOCYTES # BLD: 27 % (ref 24–43)
MCH RBC QN AUTO: 28.5 PG (ref 25.2–33.5)
MCHC RBC AUTO-ENTMCNC: 32.1 G/DL (ref 25.2–33.5)
MCV RBC AUTO: 88.9 FL (ref 82.6–102.9)
MONOCYTES # BLD: 9 % (ref 3–12)
NRBC AUTOMATED: 0 PER 100 WBC
PDW BLD-RTO: 13.3 % (ref 11.8–14.4)
PLATELET # BLD AUTO: 296 K/UL (ref 138–453)
PMV BLD AUTO: 9.4 FL (ref 8.1–13.5)
POTASSIUM SERPL-SCNC: 4.8 MMOL/L (ref 3.7–5.3)
RBC # BLD: 4.94 M/UL (ref 3.95–5.11)
SEG NEUTROPHILS: 59 % (ref 36–65)
SEGMENTED NEUTROPHILS ABSOLUTE COUNT: 3.33 K/UL (ref 1.5–8.1)
WBC # BLD AUTO: 5.7 K/UL (ref 3.5–11.3)

## 2023-02-16 PROCEDURE — 36415 COLL VENOUS BLD VENIPUNCTURE: CPT

## 2023-02-16 PROCEDURE — 84132 ASSAY OF SERUM POTASSIUM: CPT

## 2023-02-16 PROCEDURE — 84520 ASSAY OF UREA NITROGEN: CPT

## 2023-02-16 PROCEDURE — 85025 COMPLETE CBC W/AUTO DIFF WBC: CPT

## 2023-02-16 PROCEDURE — 82565 ASSAY OF CREATININE: CPT

## 2023-05-30 ENCOUNTER — HOSPITAL ENCOUNTER (OUTPATIENT)
Age: 58
Discharge: HOME | End: 2023-05-30
Payer: COMMERCIAL

## 2023-05-30 ENCOUNTER — OFFICE VISIT (OUTPATIENT)
Dept: PRIMARY CARE CLINIC | Age: 58
End: 2023-05-30
Payer: COMMERCIAL

## 2023-05-30 VITALS
BODY MASS INDEX: 18.65 KG/M2 | WEIGHT: 90.8 LBS | DIASTOLIC BLOOD PRESSURE: 74 MMHG | OXYGEN SATURATION: 98 % | TEMPERATURE: 98.3 F | SYSTOLIC BLOOD PRESSURE: 112 MMHG | HEART RATE: 74 BPM

## 2023-05-30 DIAGNOSIS — R10.9 FLANK PAIN: Primary | ICD-10-CM

## 2023-05-30 DIAGNOSIS — R10.9 FLANK PAIN: ICD-10-CM

## 2023-05-30 LAB
BACTERIA URNS QL MICRO: ABNORMAL
BILIRUB UR QL STRIP: NEGATIVE
CHARACTER UR: ABNORMAL
CLARITY UR: CLEAR
COLOR UR: YELLOW
EPI CELLS #/AREA URNS HPF: ABNORMAL /HPF (ref 0–5)
GLUCOSE UR STRIP-MCNC: NEGATIVE MG/DL
HGB UR QL STRIP.AUTO: ABNORMAL
KETONES UR STRIP-MCNC: NEGATIVE MG/DL
LEUKOCYTE ESTERASE UR QL STRIP: ABNORMAL
NITRITE UR QL STRIP: NEGATIVE
PH UR STRIP: 6 [PH] (ref 5–6)
PROT UR STRIP-MCNC: NEGATIVE MG/DL
RBC #/AREA URNS HPF: ABNORMAL /HPF (ref 0–4)
SP GR UR STRIP: 1.01 (ref 1.01–1.02)
UROBILINOGEN UR STRIP-ACNC: NORMAL
WBC #/AREA URNS HPF: ABNORMAL /HPF (ref 0–4)

## 2023-05-30 PROCEDURE — 81001 URINALYSIS AUTO W/SCOPE: CPT

## 2023-05-30 PROCEDURE — 99213 OFFICE O/P EST LOW 20 MIN: CPT | Performed by: STUDENT IN AN ORGANIZED HEALTH CARE EDUCATION/TRAINING PROGRAM

## 2023-05-30 RX ORDER — CIPROFLOXACIN 250 MG/1
250 TABLET, FILM COATED ORAL 2 TIMES DAILY
Qty: 10 TABLET | Refills: 0 | Status: SHIPPED | OUTPATIENT
Start: 2023-05-30 | End: 2023-06-04

## 2023-05-30 ASSESSMENT — PATIENT HEALTH QUESTIONNAIRE - PHQ9
SUM OF ALL RESPONSES TO PHQ QUESTIONS 1-9: 0
1. LITTLE INTEREST OR PLEASURE IN DOING THINGS: 0
2. FEELING DOWN, DEPRESSED OR HOPELESS: 0
SUM OF ALL RESPONSES TO PHQ QUESTIONS 1-9: 0
SUM OF ALL RESPONSES TO PHQ9 QUESTIONS 1 & 2: 0

## 2023-05-30 ASSESSMENT — ENCOUNTER SYMPTOMS
SHORTNESS OF BREATH: 0
ABDOMINAL PAIN: 0
DIARRHEA: 0
EYE PAIN: 0
VOMITING: 0
COUGH: 0
BLOOD IN STOOL: 0
TROUBLE SWALLOWING: 0
NAUSEA: 0
CONSTIPATION: 0

## 2023-05-30 NOTE — PROGRESS NOTES
[facility]             [address] [facility], [place], [address]                        Telephone [phone]             Fax [phone]       Char Villegas  :  1965  Age:  65 y.o. MRN:  [id number]  Date of visit:  2023     Assessment and Plan:    1. Flank pain  Flank pain concern for possible acute cystitis we will start ciprofloxacin 250 mg twice daily for 5 days. Has had history of pyelonephritis in the past no systemic symptoms suggestive of this however does have some flank pain therefore this should be covered with ciprofloxacin. UA showed 1+ leukocyte Estrace. Currently having frequency, urgency, dysuria as well  - Urinalysis with Reflex to Culture; Future  - ciprofloxacin (CIPRO) 250 MG tablet; Take 1 tablet by mouth 2 times daily for 5 days  Dispense: 10 tablet; Refill: 0      Subjective:    Char Villegas is a 65 y.o. female who presents to [facility] today (2023) for evaluation of:  Back Pain (Had frequency and pain with urination last week, use Azo and sx were better)    Patient is a 52-year-old female presents to urgent care for evaluation of frequency and pain with urination. States that symptoms began around 1 week ago. She states that she started Azo which seems to have helped her symptoms however started to experience some lower back pain never went to come in for further evaluation. She states that she does have an history of pyelonephritis in the past and wanted to get it in care before progress to that.   Chief Complaint   Patient presents with    Back Pain     Had frequency and pain with urination last week, use Azo and sx were better     She has the following problem list:  Patient Active Problem List   Diagnosis    History of blood transfusion    Breast cancer ([address]    Constipation    Tendonitis of elbow or forearm        Review of Systems   Constitutional:  Negative

## 2024-02-27 ENCOUNTER — OFFICE VISIT (OUTPATIENT)
Dept: FAMILY MEDICINE CLINIC | Age: 59
End: 2024-02-27
Payer: COMMERCIAL

## 2024-02-27 VITALS
HEART RATE: 72 BPM | SYSTOLIC BLOOD PRESSURE: 120 MMHG | DIASTOLIC BLOOD PRESSURE: 80 MMHG | HEIGHT: 58 IN | WEIGHT: 86 LBS | BODY MASS INDEX: 18.05 KG/M2

## 2024-02-27 DIAGNOSIS — E78.2 MIXED HYPERLIPIDEMIA: ICD-10-CM

## 2024-02-27 DIAGNOSIS — Z85.3 HISTORY OF BREAST CANCER: Primary | ICD-10-CM

## 2024-02-27 DIAGNOSIS — R10.84 GENERALIZED ABDOMINAL PAIN: ICD-10-CM

## 2024-02-27 DIAGNOSIS — R74.8 ELEVATED ALKALINE PHOSPHATASE LEVEL: ICD-10-CM

## 2024-02-27 DIAGNOSIS — E87.5 HYPERKALEMIA: ICD-10-CM

## 2024-02-27 DIAGNOSIS — R10.2 PELVIC PRESSURE IN FEMALE: ICD-10-CM

## 2024-02-27 PROCEDURE — 99214 OFFICE O/P EST MOD 30 MIN: CPT | Performed by: NURSE PRACTITIONER

## 2024-02-27 RX ORDER — ACETAMINOPHEN 160 MG
2000 TABLET,DISINTEGRATING ORAL DAILY
COMMUNITY

## 2024-02-27 SDOH — ECONOMIC STABILITY: FOOD INSECURITY: WITHIN THE PAST 12 MONTHS, THE FOOD YOU BOUGHT JUST DIDN'T LAST AND YOU DIDN'T HAVE MONEY TO GET MORE.: NEVER TRUE

## 2024-02-27 SDOH — ECONOMIC STABILITY: FOOD INSECURITY: WITHIN THE PAST 12 MONTHS, YOU WORRIED THAT YOUR FOOD WOULD RUN OUT BEFORE YOU GOT MONEY TO BUY MORE.: NEVER TRUE

## 2024-02-27 SDOH — ECONOMIC STABILITY: INCOME INSECURITY: HOW HARD IS IT FOR YOU TO PAY FOR THE VERY BASICS LIKE FOOD, HOUSING, MEDICAL CARE, AND HEATING?: NOT HARD AT ALL

## 2024-02-27 SDOH — ECONOMIC STABILITY: HOUSING INSECURITY
IN THE LAST 12 MONTHS, WAS THERE A TIME WHEN YOU DID NOT HAVE A STEADY PLACE TO SLEEP OR SLEPT IN A SHELTER (INCLUDING NOW)?: NO

## 2024-02-27 ASSESSMENT — PATIENT HEALTH QUESTIONNAIRE - PHQ9
SUM OF ALL RESPONSES TO PHQ QUESTIONS 1-9: 0
2. FEELING DOWN, DEPRESSED OR HOPELESS: 0
SUM OF ALL RESPONSES TO PHQ QUESTIONS 1-9: 0
SUM OF ALL RESPONSES TO PHQ QUESTIONS 1-9: 0
SUM OF ALL RESPONSES TO PHQ9 QUESTIONS 1 & 2: 0
SUM OF ALL RESPONSES TO PHQ QUESTIONS 1-9: 0
1. LITTLE INTEREST OR PLEASURE IN DOING THINGS: 0

## 2024-02-27 ASSESSMENT — ENCOUNTER SYMPTOMS
BACK PAIN: 0
NAUSEA: 0
COUGH: 0
CONSTIPATION: 0
VOMITING: 0
WHEEZING: 0
SHORTNESS OF BREATH: 0
COLOR CHANGE: 0
ABDOMINAL DISTENTION: 0
CHEST TIGHTNESS: 0
DIARRHEA: 0
ABDOMINAL PAIN: 1

## 2024-02-27 NOTE — PROGRESS NOTES
[facility] DEPARTMENT OF [facility]  [address] [facility] [address]  Dept: [phone]  Dept Fax: [phone]  Loc: [phone]    Yumiko Watters is a 58 y.o. female who presents today for her medical conditions/complaintsas noted below.  Yumiko Watters is c/o of   Chief Complaint   Patient presents with    Establish Care     HPI:      Patient here to establish care. Has not had a set PCP since Carrie Osorio left. Reports being here to establish due to oncology keeps asking if she has established with a PCP yet every time she is there. Overall is doing okay. Did have labs earlier this month at a work health fair, brought to the office to review. History of breast cancer in 2010. Continues to follow annual with [facility]. Did have to have chemo and radiation for breast cancer. Oncology orders patient's mammograms. Has had a partial hysterectomy in the past - still has ovaries. Has been having intermittent pelvic pressure, abdominal pain. Reports currently has been going on for 2 weeks but feels that she has intermittently been getting it for several years. Denies abnormal bleeding, abnormal discharge. Denies dysuria, fever, chills. Reports chronic frequency. Does report to a history of liver damage in 2006 due to being given much EPI due to a suspected allergic reaction that caused her to code. Turns out it was not an allergic reaction. Was hospitalized for 4-5 days.        No results found for: \"LABA1C\"          ( goal A1Cis < 7)   No components found for: \"LABMICR\"  LDL Calculated (mg/dL)   Date Value   02/19/2024 137   02/18/2019 130       (goal LDL is <100)   AST (U/L)   Date Value   02/19/2024 32     ALT (U/L)   Date Value   02/19/2024 24     BUN (mg/dL)   Date Value   02/19/2024 8     BP Readings from Last 3 Encounters:   02/27/24 120/80   05/30/23 112/74   08/24/22 120/84          (goal 120/80)    Past Medical

## 2024-03-12 ENCOUNTER — TELEPHONE (OUTPATIENT)
Dept: ULTRASOUND IMAGING | Age: 59
End: 2024-03-12

## 2024-03-12 DIAGNOSIS — R10.2 PELVIC PRESSURE IN FEMALE: Primary | ICD-10-CM

## 2024-03-22 ENCOUNTER — HOSPITAL ENCOUNTER (OUTPATIENT)
Age: 59
Discharge: HOME | End: 2024-03-22
Payer: COMMERCIAL

## 2024-03-22 ENCOUNTER — HOSPITAL ENCOUNTER (OUTPATIENT)
Dept: ULTRASOUND IMAGING | Age: 59
End: 2024-03-22
Payer: COMMERCIAL

## 2024-03-22 ENCOUNTER — HOSPITAL ENCOUNTER (OUTPATIENT)
Dept: ULTRASOUND IMAGING | Age: 59
Discharge: HOME | End: 2024-03-22
Payer: COMMERCIAL

## 2024-03-22 ENCOUNTER — TELEPHONE (OUTPATIENT)
Dept: FAMILY MEDICINE CLINIC | Age: 59
End: 2024-03-22

## 2024-03-22 DIAGNOSIS — R10.2 PELVIC PRESSURE IN FEMALE: ICD-10-CM

## 2024-03-22 DIAGNOSIS — R74.8 ELEVATED ALKALINE PHOSPHATASE LEVEL: ICD-10-CM

## 2024-03-22 DIAGNOSIS — E87.5 HYPERKALEMIA: ICD-10-CM

## 2024-03-22 LAB
ANION GAP SERPL CALCULATED.3IONS-SCNC: 7 MMOL/L (ref 9–17)
BILIRUB UR QL STRIP: NEGATIVE
BUN SERPL-MCNC: 16 MG/DL (ref 6–20)
BUN/CREAT SERPL: 23 (ref 9–20)
CALCIUM SERPL-MCNC: 9.2 MG/DL (ref 8.6–10.4)
CHLORIDE SERPL-SCNC: 107 MMOL/L (ref 98–107)
CLARITY UR: CLEAR
CO2 SERPL-SCNC: 29 MMOL/L (ref 20–31)
COLOR UR: YELLOW
COMMENT: NORMAL
CREAT SERPL-MCNC: 0.7 MG/DL (ref 0.5–0.9)
GFR SERPL CREATININE-BSD FRML MDRD: >60 ML/MIN/1.73M2
GLUCOSE SERPL-MCNC: 90 MG/DL (ref 70–99)
GLUCOSE UR STRIP-MCNC: NEGATIVE MG/DL
HGB UR QL STRIP.AUTO: NEGATIVE
KETONES UR STRIP-MCNC: NEGATIVE MG/DL
LEUKOCYTE ESTERASE UR QL STRIP: NEGATIVE
NITRITE UR QL STRIP: NEGATIVE
PH UR STRIP: 6 [PH] (ref 5–6)
POTASSIUM SERPL-SCNC: 4.4 MMOL/L (ref 3.7–5.3)
PROT UR STRIP-MCNC: NEGATIVE MG/DL
SODIUM SERPL-SCNC: 143 MMOL/L (ref 135–144)
SP GR UR STRIP: 1.01 (ref 1.01–1.02)
UROBILINOGEN UR STRIP-ACNC: NORMAL EU/DL (ref 0–1)

## 2024-03-22 PROCEDURE — 81003 URINALYSIS AUTO W/O SCOPE: CPT

## 2024-03-22 PROCEDURE — 76856 US EXAM PELVIC COMPLETE: CPT

## 2024-03-22 PROCEDURE — 80048 BASIC METABOLIC PNL TOTAL CA: CPT

## 2024-03-22 PROCEDURE — 76705 ECHO EXAM OF ABDOMEN: CPT

## 2024-03-22 PROCEDURE — 36415 COLL VENOUS BLD VENIPUNCTURE: CPT

## 2024-03-22 NOTE — TELEPHONE ENCOUNTER
----- Message from LUIS Yi - CNP sent at 3/22/2024  2:00 PM EDT -----  Potassium back to normal on lab recheck  Urinalysis negative    Still waiting for her US to come back.

## 2024-04-03 ENCOUNTER — OFFICE VISIT (OUTPATIENT)
Dept: PRIMARY CARE CLINIC | Age: 59
End: 2024-04-03
Payer: COMMERCIAL

## 2024-04-03 VITALS
HEART RATE: 66 BPM | TEMPERATURE: 97.3 F | OXYGEN SATURATION: 99 % | BODY MASS INDEX: 17.54 KG/M2 | WEIGHT: 87 LBS | HEIGHT: 59 IN | SYSTOLIC BLOOD PRESSURE: 112 MMHG | DIASTOLIC BLOOD PRESSURE: 62 MMHG | RESPIRATION RATE: 16 BRPM

## 2024-04-03 DIAGNOSIS — H10.501 BLEPHAROCONJUNCTIVITIS OF RIGHT EYE, UNSPECIFIED BLEPHAROCONJUNCTIVITIS TYPE: Primary | ICD-10-CM

## 2024-04-03 PROCEDURE — 99213 OFFICE O/P EST LOW 20 MIN: CPT | Performed by: FAMILY MEDICINE

## 2024-04-03 RX ORDER — ERYTHROMYCIN 5 MG/G
OINTMENT OPHTHALMIC
Qty: 1 EACH | Refills: 0 | OUTPATIENT
Start: 2024-04-03 | End: 2024-04-13

## 2024-04-03 NOTE — PROGRESS NOTES
[place] [address]                        Telephone [phone]             Fax [phone]       Yumiko Watters  :  1965  Age:  58 y.o.   MRN:  [id number]  Date of visit:  4/3/2024       Assessment & Plan:    Blepharoconjunctivitis of right eye, unspecified blepharoconjunctivitis type  - erythromycin (ROMYCIN) 5 MG/GM ophthalmic ointment; Apply 1/2 inch ribbon to right eye 4 times daily for 5 days.  Dispense: 1 each; Refill: 0    Printed information regarding [facility] was provided to the patient with the after visit summary.      She was advised to follow up if symptoms worsen or do not resolve.         Subjective:    Yumiko Watters is a 58 y.o. female who presents to [facility] today (4/3/2024) for evaluation of:  Eye Drainage (Pt woke with right eye pain, drainage and swelling around the skin. )      She reports drainage from the right eye that began this morning.  She has also had swelling of the eyelids of the right eye.  She does not wear contact lenses.  She has a history of dry eye, and she uses Systane eye drops as needed, but she has not used any eye drops recently.  She denies fever, chills, cough, or sore throat.         Current medications are:  Current Outpatient Medications   Medication Sig Dispense Refill    Vitamin D3 125 MCG (5000 UT) TABS tablet Take 1 tablet by mouth daily      Multiple Vitamin (MULTIVITAMINS PO) Take 1 capsule by mouth 2 times daily      magnesium 30 MG tablet Take 1 tablet by mouth daily       No current facility-administered medications for this visit.       She is allergic to codeine, darvon [propoxyphene], penicillins, and sulfa antibiotics.  She has intolerance of multiple pain medications.    She has the following problem list:  Patient Active Problem List   Diagnosis    History of blood transfusion    Breast cancer (HCC)

## 2024-06-18 ENCOUNTER — HOSPITAL ENCOUNTER (OUTPATIENT)
Dept: MAMMOGRAPHY | Age: 59
Discharge: HOME | End: 2024-06-20
Payer: COMMERCIAL

## 2024-06-18 VITALS — BODY MASS INDEX: 17.34 KG/M2 | HEIGHT: 59 IN | WEIGHT: 86 LBS

## 2024-06-18 DIAGNOSIS — Z12.31 ENCOUNTER FOR SCREENING MAMMOGRAM FOR MALIGNANT NEOPLASM OF BREAST: ICD-10-CM

## 2024-06-18 PROCEDURE — 77063 BREAST TOMOSYNTHESIS BI: CPT

## 2025-01-17 NOTE — TELEPHONE ENCOUNTER
This patient is scheduled for a pelvic complete (transabdominal portion only) ultrasound. If transvaginal imaging is also requested, please enter in a combined order- [facility] [address]. Thank you!   HPV scab rectal

## 2025-06-24 ENCOUNTER — TRANSCRIBE ORDERS (OUTPATIENT)
Dept: GENERAL RADIOLOGY | Age: 60
End: 2025-06-24

## 2025-06-24 DIAGNOSIS — Z85.3 PERSONAL HISTORY OF MALIGNANT NEOPLASM OF BREAST: Primary | ICD-10-CM

## 2025-07-22 ENCOUNTER — HOSPITAL ENCOUNTER (OUTPATIENT)
Dept: MAMMOGRAPHY | Age: 60
Discharge: HOME | End: 2025-07-24
Payer: COMMERCIAL

## 2025-07-22 VITALS — BODY MASS INDEX: 17.77 KG/M2 | WEIGHT: 88 LBS

## 2025-07-22 DIAGNOSIS — Z85.3 PERSONAL HISTORY OF MALIGNANT NEOPLASM OF BREAST: ICD-10-CM

## 2025-07-22 PROCEDURE — 77063 BREAST TOMOSYNTHESIS BI: CPT

## 2025-08-08 ENCOUNTER — HOSPITAL ENCOUNTER (OUTPATIENT)
Dept: LAB | Age: 60
Discharge: HOME | End: 2025-08-08
Payer: COMMERCIAL

## 2025-08-08 LAB
ALBUMIN SERPL-MCNC: 4.5 G/DL (ref 3.5–5.2)
ALBUMIN/GLOB SERPL: 1.8 {RATIO} (ref 1–2.5)
ALP SERPL-CCNC: 171 U/L (ref 35–104)
ALT SERPL-CCNC: 15 U/L (ref 10–35)
ANION GAP SERPL CALCULATED.3IONS-SCNC: 11 MMOL/L (ref 9–16)
AST SERPL-CCNC: 29 U/L (ref 10–35)
BASOPHILS # BLD: 0.03 K/UL (ref 0–0.2)
BASOPHILS NFR BLD: 1 % (ref 0–2)
BILIRUB SERPL-MCNC: <0.2 MG/DL (ref 0–1.2)
BUN SERPL-MCNC: 16 MG/DL (ref 6–20)
BUN/CREAT SERPL: 20 (ref 9–20)
CALCIUM SERPL-MCNC: 9.5 MG/DL (ref 8.6–10.4)
CHLORIDE SERPL-SCNC: 105 MMOL/L (ref 98–107)
CO2 SERPL-SCNC: 27 MMOL/L (ref 20–31)
CREAT SERPL-MCNC: 0.8 MG/DL (ref 0.6–0.9)
EOSINOPHIL # BLD: 0.18 K/UL (ref 0–0.44)
EOSINOPHILS RELATIVE PERCENT: 3 % (ref 1–4)
ERYTHROCYTE [DISTWIDTH] IN BLOOD BY AUTOMATED COUNT: 13.8 % (ref 11.8–14.4)
GFR, ESTIMATED: 85 ML/MIN/1.73M2
GLUCOSE SERPL-MCNC: 119 MG/DL (ref 74–99)
HCT VFR BLD AUTO: 43.4 % (ref 36.3–47.1)
HGB BLD-MCNC: 13.8 G/DL (ref 11.9–15.1)
IMM GRANULOCYTES # BLD AUTO: <0.03 K/UL (ref 0–0.3)
IMM GRANULOCYTES NFR BLD: 0 %
LYMPHOCYTES NFR BLD: 2.1 K/UL (ref 1.1–3.7)
LYMPHOCYTES RELATIVE PERCENT: 35 % (ref 24–43)
MCH RBC QN AUTO: 28.2 PG (ref 25.2–33.5)
MCHC RBC AUTO-ENTMCNC: 31.8 G/DL (ref 25.2–33.5)
MCV RBC AUTO: 88.6 FL (ref 82.6–102.9)
MONOCYTES NFR BLD: 0.51 K/UL (ref 0.1–1.2)
MONOCYTES NFR BLD: 9 % (ref 3–12)
NEUTROPHILS NFR BLD: 52 % (ref 36–65)
NEUTS SEG NFR BLD: 3.14 K/UL (ref 1.5–8.1)
NRBC BLD-RTO: 0 PER 100 WBC
PLATELET # BLD AUTO: 303 K/UL (ref 138–453)
PMV BLD AUTO: 9.4 FL (ref 8.1–13.5)
POTASSIUM SERPL-SCNC: 3.9 MMOL/L (ref 3.7–5.3)
PROT SERPL-MCNC: 7 G/DL (ref 6.6–8.7)
RBC # BLD AUTO: 4.9 M/UL (ref 3.95–5.11)
SODIUM SERPL-SCNC: 143 MMOL/L (ref 136–145)
WBC OTHER # BLD: 6 K/UL (ref 3.5–11.3)

## 2025-08-08 PROCEDURE — 85025 COMPLETE CBC W/AUTO DIFF WBC: CPT

## 2025-08-08 PROCEDURE — 36415 COLL VENOUS BLD VENIPUNCTURE: CPT

## 2025-08-08 PROCEDURE — 80053 COMPREHEN METABOLIC PANEL: CPT

## (undated) DEVICE — 60 ML SYRINGE REGULAR TIP: Brand: MONOJECT

## (undated) DEVICE — DISCONTINUED USE 419147 KIT ENDOSCOPY CUSTOM PACK

## (undated) DEVICE — 1200CC GUARDIAN II: Brand: GUARDIAN

## (undated) DEVICE — COLONOSCOPE ENDOSCP ABSORBENT SM PEDIATRIC 104 MM VISION

## (undated) DEVICE — LINE SAMP O2 6.5FT W/FEMALE CONN F/ADULT CAPNOLINE PLUS

## (undated) DEVICE — CONNECTOR TBNG AUX H2O JET DISP FOR OLY 160/180 SER